# Patient Record
Sex: FEMALE | URBAN - METROPOLITAN AREA
[De-identification: names, ages, dates, MRNs, and addresses within clinical notes are randomized per-mention and may not be internally consistent; named-entity substitution may affect disease eponyms.]

---

## 2020-06-16 ENCOUNTER — APPOINTMENT (OUTPATIENT)
Dept: URBAN - METROPOLITAN AREA CLINIC 270 | Age: 35
Setting detail: DERMATOLOGY
End: 2020-06-18

## 2020-06-16 DIAGNOSIS — L25.9 UNSPECIFIED CONTACT DERMATITIS, UNSPECIFIED CAUSE: ICD-10-CM

## 2020-06-16 PROCEDURE — OTHER PRESCRIPTION: OTHER

## 2020-06-16 PROCEDURE — OTHER COUNSELING: OTHER

## 2020-06-16 PROCEDURE — 99202 OFFICE O/P NEW SF 15 MIN: CPT

## 2020-06-16 ASSESSMENT — LOCATION DETAILED DESCRIPTION DERM: LOCATION DETAILED: PERIUMBILICAL SKIN

## 2020-06-16 ASSESSMENT — LOCATION SIMPLE DESCRIPTION DERM: LOCATION SIMPLE: ABDOMEN

## 2020-06-16 ASSESSMENT — LOCATION ZONE DERM: LOCATION ZONE: TRUNK

## 2020-06-16 NOTE — HPI: RASH
What Type Of Note Output Would You Prefer (Optional)?: Bullet Format
How Severe Is Your Rash?: moderate
Is This A New Presentation, Or A Follow-Up?: Rash
Additional History: Patient states she has a steroid shot last Tuesday and has been applying TAC with no improvement

## 2020-06-16 NOTE — PROCEDURE: COUNSELING
Patient Specific Counseling (Will Not Stick From Patient To Patient): Clarify with OB that prednisone tabs are okay to take while breast feeding.
Detail Level: Detailed

## 2020-06-30 ENCOUNTER — APPOINTMENT (OUTPATIENT)
Dept: URBAN - METROPOLITAN AREA CLINIC 270 | Age: 35
Setting detail: DERMATOLOGY
End: 2020-07-01

## 2020-06-30 DIAGNOSIS — L30.8 OTHER SPECIFIED DERMATITIS: ICD-10-CM

## 2020-06-30 PROCEDURE — OTHER COUNSELING: OTHER

## 2020-06-30 PROCEDURE — 99213 OFFICE O/P EST LOW 20 MIN: CPT

## 2020-06-30 RX ORDER — PREDNISONE 20 MG/1
TABLET ORAL
Qty: 12 | Refills: 0 | Status: CANCELLED

## 2020-06-30 NOTE — PROCEDURE: COUNSELING
Patient Specific Counseling (Will Not Stick From Patient To Patient): Patient has completed prednisone tabs.  Using moisturizers/emollients such as aquaphor will help.  May take antihistamines such as Benadryl at night to help with and itching, \\nPatient reports itching has subsided with prednisone po.
Detail Level: Detailed

## 2020-07-30 ENCOUNTER — APPOINTMENT (OUTPATIENT)
Dept: URBAN - METROPOLITAN AREA CLINIC 270 | Age: 35
Setting detail: DERMATOLOGY
End: 2020-07-30

## 2020-07-30 DIAGNOSIS — D22 MELANOCYTIC NEVI: ICD-10-CM

## 2020-07-30 DIAGNOSIS — D18.0 HEMANGIOMA: ICD-10-CM

## 2020-07-30 DIAGNOSIS — L81.4 OTHER MELANIN HYPERPIGMENTATION: ICD-10-CM

## 2020-07-30 PROBLEM — D22.9 MELANOCYTIC NEVI, UNSPECIFIED: Status: ACTIVE | Noted: 2020-07-30

## 2020-07-30 PROBLEM — D18.01 HEMANGIOMA OF SKIN AND SUBCUTANEOUS TISSUE: Status: ACTIVE | Noted: 2020-07-30

## 2020-07-30 PROCEDURE — OTHER REASSURANCE: OTHER

## 2020-07-30 PROCEDURE — 99214 OFFICE O/P EST MOD 30 MIN: CPT

## 2020-07-30 PROCEDURE — OTHER COUNSELING: OTHER
